# Patient Record
Sex: MALE | Race: WHITE | NOT HISPANIC OR LATINO | Employment: FULL TIME | URBAN - METROPOLITAN AREA
[De-identification: names, ages, dates, MRNs, and addresses within clinical notes are randomized per-mention and may not be internally consistent; named-entity substitution may affect disease eponyms.]

---

## 2024-03-12 ENCOUNTER — OFFICE VISIT (OUTPATIENT)
Dept: PRIMARY CARE | Facility: CLINIC | Age: 24
End: 2024-03-12
Payer: COMMERCIAL

## 2024-03-12 VITALS
DIASTOLIC BLOOD PRESSURE: 86 MMHG | WEIGHT: 230 LBS | TEMPERATURE: 97.1 F | HEIGHT: 71 IN | OXYGEN SATURATION: 98 % | HEART RATE: 107 BPM | SYSTOLIC BLOOD PRESSURE: 142 MMHG | RESPIRATION RATE: 16 BRPM | BODY MASS INDEX: 32.2 KG/M2

## 2024-03-12 DIAGNOSIS — E66.9 OBESITY (BMI 30.0-34.9): ICD-10-CM

## 2024-03-12 DIAGNOSIS — R03.0 ELEVATED BLOOD PRESSURE READING: Primary | ICD-10-CM

## 2024-03-12 DIAGNOSIS — Z13.9 SCREENING DUE: ICD-10-CM

## 2024-03-12 PROCEDURE — 99203 OFFICE O/P NEW LOW 30 MIN: CPT | Performed by: NURSE PRACTITIONER

## 2024-03-12 ASSESSMENT — ENCOUNTER SYMPTOMS
FEVER: 0
HEADACHES: 0
FATIGUE: 0
SHORTNESS OF BREATH: 0
PALPITATIONS: 0
OCCASIONAL FEELINGS OF UNSTEADINESS: 0
WHEEZING: 0
CHILLS: 0
DIARRHEA: 0
COUGH: 0
ABDOMINAL PAIN: 0
DEPRESSION: 0
CONSTIPATION: 0
LOSS OF SENSATION IN FEET: 0
DIZZINESS: 0
SINUS PRESSURE: 0
VOMITING: 0
WEAKNESS: 0
NAUSEA: 0
SORE THROAT: 0

## 2024-03-12 ASSESSMENT — PATIENT HEALTH QUESTIONNAIRE - PHQ9
2. FEELING DOWN, DEPRESSED OR HOPELESS: NOT AT ALL
SUM OF ALL RESPONSES TO PHQ9 QUESTIONS 1 AND 2: 0
1. LITTLE INTEREST OR PLEASURE IN DOING THINGS: NOT AT ALL

## 2024-03-12 NOTE — PATIENT INSTRUCTIONS
"Thank you for your visit today! Check labs as ordered. Follow up in 3 months for recheck, or sooner with any additional concerns.           Can I lose weight by changing my diet?  Yes. There is no 1 \"weight loss diet\" that works for everyone. But making changes to your diet and lifestyle can help you lose weight in a healthy way. Many \"trendy\" weight loss programs can end up being more harmful than helpful.    The best weight loss plans help you have a healthy view of eating. With a good weight loss plan, many people are able to lose weight and keep it off. Reducing calories in your diet, burning calories through exercise, or both can help you lose weight. Combining a healthy diet with regular physical activity can help you get the best results.    How do I start a weight loss diet?  In general, to lose weight, you have to eat fewer calories than your body burns.    If you would like to lose weight, it's a good idea to start by talking to your doctor or nurse. They can help you make a plan to lose weight in a healthy way. They can also help you understand how many calories your body needs for energy. Losing weight takes work and can be hard, so it helps to have support.    It can also help to work with a dietitian (food expert). They can also tell you which foods can give you the nutrients you need while also working toward your weight loss goals.    For most people, a healthy weight loss goal is 1 to 2 pounds (0.5 to 1 kg) per week.    Should I exercise to help lose weight?  Yes. Moving your body can help you lose weight and feel better. There are lots of different ways to get physical activity, no matter your age, size, or ability.     To increase your activity, you can walk, dance, garden, or even just move your arms while sitting. Even gentle forms of exercise are good for your health. For weight loss, the important thing is to increase the number of calories you burn by moving more. And you have to keep doing " the extra activity over time.    What lifestyle changes can help me lose weight?  There are many things you can try to help you lose weight. You can:    ?Try eating more slowly. Eat smaller meals more often. Do not skip meals.    ?Watch your portions at home and when eating out. When eating out, split an order with someone, or bring home half to eat later for another meal.    ?Use a smaller plate or bowl for meals. Use a measuring cup to help figure out portion sizes.    ?Plan ahead for meals and snacks. Have healthy foods (like raw vegetables, fruits, and nuts) ready for when you are hungry between meals. Portion out foods. Put snacks and other foods into single servings. This can help with portion control.    ?Drink water before and after your meals. This can help you feel more full without adding extra calories.    ?Eat protein at each meal. This can make you feel more full.    ?Have a friend or family member support you as you work to lose weight. They can also hold you accountable to your healthy eating plan.    What foods and drinks should I eat when trying to lose weight?  Try to choose foods that are lower in fat and calories.    ?Grains - Choose whole-grain, high-fiber foods such as whole-grain breads, cereals, brown rice, or pasta.    ?Fruits and vegetables - Eat many kinds and colors of fruits and vegetables. If possible, eat fresh fruits and vegetables. If that is not possible, frozen ones are good, too. Check the label on canned fruits and vegetables. Choose ones packed in their own juices or water and that do not have sugar added.    ?Dairy - Choose low-fat (1 percent) or fat-free milk and other dairy products. Use spray butter or margarine.    ?Lean meats, poultry, seafood, and proteins - Try to eat low-fat or lean meats that are baked or broiled, like white meat chicken or turkey without the skin. Eat less red meat. Avoid processed meat such as prepackaged sausage and hot dogs. Eat more fish, eggs,  and beans instead. Dried peas, beans, and lentils are also high in protein and fiber.    ?Condiments and other foods - Choose fresh or dried herbs, lemon juice, seasonings without salt, mustard, vinegar, hot sauce, and fat-free or low-fat sour cream or salad dressings.    ?Drinks - Drink plenty of fluids such as water, unsweetened tea or seltzer, or coffee. You can add fruit or sugar-free flavoring to drinks.    What foods and drinks should I avoid or limit when trying to lose weight?    ?Grains to avoid - Avoid cookies, cakes, candy, doughnuts, granola, baked goods, muffins, chips, crackers, commercially prepared rice, pasta, and stuffing mixes.    ?Fruits or vegetables to avoid - Avoid prepackaged vegetables in sauces and canned fruits with extra sugar added.    ?Dairy products to avoid - Avoid whole milk, full-fat dairy products, butter, and lard.    ?Meats, poultry, seafood, and proteins to avoid - Avoid high-fat meats like beef, lamb, poultry with the skin, and processed meats like bologna, pepperoni, or salami.    ?Condiments and other foods to avoid - Avoid full-fat mayonnaise and salad dressings, gravies, and sauces.    ?Drinks to avoid - Avoid sweetened beverages like soda, juice, and sweet tea. Avoid or limit alcohol, including beer, wine, and mixed drinks. Avoid artificial sweeteners and high-fructose corn syrup. Avoid energy drinks.    What else should I know?    ?Try making small changes in how you eat, rather than trying to change everything at once.    ?It can take 30 to 45 minutes for your brain to realize that you are full. If you are still hungry after eating, wait before eating more. If you are still hungry after time has passed, try eating something high in protein, like a hard-boiled egg.    ?Try eating sorbet or fruit compote if you crave sweets. For salty cravings, eat seasoned rice cakes or make sweet potato crisps in the oven or air fryer.    ?It can also help to make changes in how you  prepare foods. For example:    °Remove the fatty part of meats and the skin from poultry before cooking.    °Bake, broil, grill, poach, or roast poultry, fish, and lean meats instead of frying them.    °Use olive or canola oil for cooking or baking.

## 2024-03-12 NOTE — PROGRESS NOTES
"Subjective   Patient ID: Robbin May is a 23 y.o. male who presents for Establish Care.    Patient states he has not been seen for a few years and wants to make sure everything is okay.     HPI   MALE HEALTH MAINTENANCE -  FLU: doesn't get regularly  PCV 13 (>65): N/A  PPSV 23 (>65): N/A  SHINGLES shingrix (>50): N/A  RSV arexvy(>60): N/A     COLON CANCER SCREENING (45-74 years old): N/A  DEXA SCAN (65- years old): N/A  Yearly Physical- Today  Tetanus-Every 10 years  Gardasil Vaccine: until 26 year old UTD per patient/Mom   Dentist: scheduled today    Patient states that he has gained some weight since finishing high school. He currently doesn't particularly watch his diet and gets no regular exercise. He is planning on cutting out fast food and soda. He feels that he is getting adequate sleep at night (8 hours/night).   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 Has steady girlfriend    Review of Systems   Constitutional:  Negative for chills, fatigue and fever.   HENT:  Negative for congestion, sinus pressure and sore throat.    Respiratory:  Negative for cough, shortness of breath and wheezing.    Cardiovascular:  Negative for chest pain and palpitations.   Gastrointestinal:  Negative for abdominal pain, constipation, diarrhea, nausea and vomiting.   Neurological:  Negative for dizziness, weakness and headaches.       Objective   /86   Pulse 107   Temp 36.2 °C (97.1 °F)   Resp 16   Ht 1.803 m (5' 11\")   Wt 104 kg (230 lb)   SpO2 98%   BMI 32.08 kg/m²     Physical Exam  Vitals and nursing note reviewed.   Constitutional:       General: He is not in acute distress.     Appearance: Normal appearance.   HENT:      Right Ear: Tympanic membrane normal.      Left Ear: Tympanic membrane normal.      Nose: No congestion.      Mouth/Throat:      Pharynx: No oropharyngeal exudate or posterior oropharyngeal erythema.   Eyes:      Conjunctiva/sclera: Conjunctivae " normal.      Pupils: Pupils are equal, round, and reactive to light.   Cardiovascular:      Rate and Rhythm: Normal rate and regular rhythm.      Heart sounds: Normal heart sounds.   Pulmonary:      Effort: Pulmonary effort is normal.      Breath sounds: Normal breath sounds. No wheezing, rhonchi or rales.   Abdominal:      General: Abdomen is flat. Bowel sounds are normal.      Palpations: Abdomen is soft.      Tenderness: There is no abdominal tenderness.   Lymphadenopathy:      Cervical: No cervical adenopathy.   Skin:     General: Skin is warm and dry.   Neurological:      Mental Status: He is alert.   Psychiatric:         Mood and Affect: Mood normal.         Behavior: Behavior normal.         Assessment/Plan   Problem List Items Addressed This Visit    None  Visit Diagnoses         Codes    BMI 32.0-32.9,adult    -  Primary Z68.32    Screening due     Z13.9    Relevant Orders    Lipid panel    CBC and Auto Differential    Comprehensive metabolic panel    Tsh With Reflex To Free T4 If Abnormal          Check labs. Encouraged healthy diet and regular exercise. Follow up in 3 months for recheck, or sooner depending on lab results.